# Patient Record
Sex: FEMALE | Race: WHITE | NOT HISPANIC OR LATINO | URBAN - METROPOLITAN AREA
[De-identification: names, ages, dates, MRNs, and addresses within clinical notes are randomized per-mention and may not be internally consistent; named-entity substitution may affect disease eponyms.]

---

## 2019-03-08 ENCOUNTER — APPOINTMENT (RX ONLY)
Dept: URBAN - METROPOLITAN AREA CLINIC 369 | Facility: CLINIC | Age: 34
Setting detail: DERMATOLOGY
End: 2019-03-08

## 2019-03-08 DIAGNOSIS — D22 MELANOCYTIC NEVI: ICD-10-CM

## 2019-03-08 DIAGNOSIS — L71.8 OTHER ROSACEA: ICD-10-CM

## 2019-03-08 DIAGNOSIS — L82.1 OTHER SEBORRHEIC KERATOSIS: ICD-10-CM

## 2019-03-08 DIAGNOSIS — L81.4 OTHER MELANIN HYPERPIGMENTATION: ICD-10-CM

## 2019-03-08 DIAGNOSIS — Z71.89 OTHER SPECIFIED COUNSELING: ICD-10-CM

## 2019-03-08 DIAGNOSIS — D18.0 HEMANGIOMA: ICD-10-CM

## 2019-03-08 PROBLEM — D22.61 MELANOCYTIC NEVI OF RIGHT UPPER LIMB, INCLUDING SHOULDER: Status: ACTIVE | Noted: 2019-03-08

## 2019-03-08 PROBLEM — D22.4 MELANOCYTIC NEVI OF SCALP AND NECK: Status: ACTIVE | Noted: 2019-03-08

## 2019-03-08 PROBLEM — D18.01 HEMANGIOMA OF SKIN AND SUBCUTANEOUS TISSUE: Status: ACTIVE | Noted: 2019-03-08

## 2019-03-08 PROBLEM — D22.5 MELANOCYTIC NEVI OF TRUNK: Status: ACTIVE | Noted: 2019-03-08

## 2019-03-08 PROCEDURE — ? BIOPSY BY SHAVE METHOD

## 2019-03-08 PROCEDURE — 99203 OFFICE O/P NEW LOW 30 MIN: CPT | Mod: 25

## 2019-03-08 PROCEDURE — 11103 TANGNTL BX SKIN EA SEP/ADDL: CPT

## 2019-03-08 PROCEDURE — 11102 TANGNTL BX SKIN SINGLE LES: CPT

## 2019-03-08 PROCEDURE — ? TREATMENT REGIMEN

## 2019-03-08 PROCEDURE — ? COUNSELING

## 2019-03-08 ASSESSMENT — LOCATION DETAILED DESCRIPTION DERM
LOCATION DETAILED: RIGHT INFERIOR CENTRAL MALAR CHEEK
LOCATION DETAILED: LEFT CLAVICULAR SKIN
LOCATION DETAILED: LEFT INFERIOR CENTRAL MALAR CHEEK
LOCATION DETAILED: RIGHT ANTERIOR PROXIMAL UPPER ARM
LOCATION DETAILED: RIGHT RIB CAGE
LOCATION DETAILED: EPIGASTRIC SKIN
LOCATION DETAILED: LEFT INFERIOR LATERAL NECK

## 2019-03-08 ASSESSMENT — LOCATION ZONE DERM
LOCATION ZONE: NECK
LOCATION ZONE: FACE
LOCATION ZONE: ARM
LOCATION ZONE: TRUNK

## 2019-03-08 ASSESSMENT — LOCATION SIMPLE DESCRIPTION DERM
LOCATION SIMPLE: LEFT ANTERIOR NECK
LOCATION SIMPLE: RIGHT CHEEK
LOCATION SIMPLE: LEFT CLAVICULAR SKIN
LOCATION SIMPLE: ABDOMEN
LOCATION SIMPLE: LEFT CHEEK
LOCATION SIMPLE: RIGHT UPPER ARM

## 2019-03-08 NOTE — PROCEDURE: BIOPSY BY SHAVE METHOD
Size Of Lesion In Cm: 0
Notification Instructions: Patient will be notified of biopsy results. However, patient instructed to call the office if not contacted within 2 weeks.
Cryotherapy Text: The wound bed was treated with cryotherapy after the biopsy was performed.
Silver Nitrate Text: The wound bed was treated with silver nitrate after the biopsy was performed.
Render Post-Care Instructions In Note?: yes
Hemostasis: Aluminum Chloride
Consent: Written consent was obtained and risks were reviewed including but not limited to scarring, infection, bleeding, scabbing, incomplete removal, nerve damage and allergy to anesthesia.
Detail Level: Detailed
Bill 94663 For Specimen Handling/Conveyance To Laboratory?: no
Anesthesia Volume In Cc (Will Not Render If 0): 1
Wound Care: Petrolatum
Dressing: bandage
Biopsy Method: Dermablade
Billing Type: Third-Party Bill
Curettage Text: The wound bed was treated with curettage after the biopsy was performed.
Depth Of Biopsy: dermis
Anesthesia Type: 1% lidocaine with epinephrine and a 1:10 solution of 8.4% sodium bicarbonate
Type Of Destruction Used: Curettage
Electrodesiccation Text: The wound bed was treated with electrodesiccation after the biopsy was performed.
Post-Care Instructions: I reviewed with the patient in detail post-care instructions. Patient is to keep the biopsy site dry overnight, and then apply bacitracin twice daily until healed. Patient may apply hydrogen peroxide soaks to remove any crusting.
Electrodesiccation And Curettage Text: The wound bed was treated with electrodesiccation and curettage after the biopsy was performed.
Biopsy Type: H and E
Body Location Override (Optional - Billing Will Still Be Based On Selected Body Map Location If Applicable): right upper arm
Body Location Override (Optional - Billing Will Still Be Based On Selected Body Map Location If Applicable): left neck
Body Location Override (Optional - Billing Will Still Be Based On Selected Body Map Location If Applicable): left clavicular skin superior
Body Location Override (Optional - Billing Will Still Be Based On Selected Body Map Location If Applicable): left clavicular skin inferior

## 2019-07-16 ENCOUNTER — APPOINTMENT (RX ONLY)
Dept: URBAN - METROPOLITAN AREA CLINIC 369 | Facility: CLINIC | Age: 34
Setting detail: DERMATOLOGY
End: 2019-07-16

## 2019-07-16 DIAGNOSIS — D22 MELANOCYTIC NEVI: ICD-10-CM

## 2019-07-16 DIAGNOSIS — L71.8 OTHER ROSACEA: ICD-10-CM

## 2019-07-16 DIAGNOSIS — D485 NEOPLASM OF UNCERTAIN BEHAVIOR OF SKIN: ICD-10-CM

## 2019-07-16 PROBLEM — D22.62 MELANOCYTIC NEVI OF LEFT UPPER LIMB, INCLUDING SHOULDER: Status: ACTIVE | Noted: 2019-07-16

## 2019-07-16 PROBLEM — D48.5 NEOPLASM OF UNCERTAIN BEHAVIOR OF SKIN: Status: ACTIVE | Noted: 2019-07-16

## 2019-07-16 PROBLEM — D22.5 MELANOCYTIC NEVI OF TRUNK: Status: ACTIVE | Noted: 2019-07-16

## 2019-07-16 PROCEDURE — 11102 TANGNTL BX SKIN SINGLE LES: CPT

## 2019-07-16 PROCEDURE — ? COUNSELING

## 2019-07-16 PROCEDURE — ? BIOPSY BY SHAVE METHOD

## 2019-07-16 PROCEDURE — 99213 OFFICE O/P EST LOW 20 MIN: CPT | Mod: 25

## 2019-07-16 PROCEDURE — 11103 TANGNTL BX SKIN EA SEP/ADDL: CPT

## 2019-07-16 PROCEDURE — ? PRESCRIPTION

## 2019-07-16 ASSESSMENT — LOCATION DETAILED DESCRIPTION DERM
LOCATION DETAILED: LEFT SUPERIOR LATERAL MALAR CHEEK
LOCATION DETAILED: LEFT DISTAL POSTERIOR UPPER ARM
LOCATION DETAILED: RIGHT MID-UPPER BACK
LOCATION DETAILED: LEFT MID-UPPER BACK

## 2019-07-16 ASSESSMENT — LOCATION SIMPLE DESCRIPTION DERM
LOCATION SIMPLE: LEFT CHEEK
LOCATION SIMPLE: RIGHT UPPER BACK
LOCATION SIMPLE: LEFT UPPER BACK
LOCATION SIMPLE: LEFT POSTERIOR UPPER ARM

## 2019-07-16 ASSESSMENT — LOCATION ZONE DERM
LOCATION ZONE: ARM
LOCATION ZONE: TRUNK
LOCATION ZONE: FACE

## 2019-07-16 NOTE — PROCEDURE: BIOPSY BY SHAVE METHOD
Wound Care: Petrolatum
Render Post-Care Instructions In Note?: no
X Size Of Lesion In Cm: 0
Dressing: bandage
Billing Type: Third-Party Bill
Consent: Written consent was obtained and risks were reviewed including but not limited to scarring, infection, bleeding, scabbing, incomplete removal, nerve damage and allergy to anesthesia.
Depth Of Biopsy: dermis
Biopsy Method: Dermablade
Detail Level: Detailed
Electrodesiccation Text: The wound bed was treated with electrodesiccation after the biopsy was performed.
Type Of Destruction Used: Curettage
Post-Care Instructions: I reviewed with the patient in detail post-care instructions. Patient is to keep the biopsy site dry overnight, and then apply bacitracin twice daily until healed. Patient may apply hydrogen peroxide soaks to remove any crusting.
Anesthesia Volume In Cc (Will Not Render If 0): 0.5
Hemostasis: Drysol
Was A Bandage Applied: Yes
Electrodesiccation And Curettage Text: The wound bed was treated with electrodesiccation and curettage after the biopsy was performed.
Curettage Text: The wound bed was treated with curettage after the biopsy was performed.
Silver Nitrate Text: The wound bed was treated with silver nitrate after the biopsy was performed.
Notification Instructions: Patient will be notified of biopsy results. However, patient instructed to call the office if not contacted within 2 weeks.
Cryotherapy Text: The wound bed was treated with cryotherapy after the biopsy was performed.
Biopsy Type: H and E
Anesthesia Type: 1% lidocaine with epinephrine

## 2019-07-18 RX ORDER — OXYMETAZOLINE HYDROCHLORIDE 10 MG/G
CREAM TOPICAL
Qty: 1 | Refills: 2 | Status: ERX | COMMUNITY
Start: 2019-07-18

## 2019-07-18 RX ADMIN — OXYMETAZOLINE HYDROCHLORIDE: 10 CREAM TOPICAL at 00:00

## 2019-07-26 ENCOUNTER — APPOINTMENT (RX ONLY)
Dept: URBAN - METROPOLITAN AREA CLINIC 369 | Facility: CLINIC | Age: 34
Setting detail: DERMATOLOGY
End: 2019-07-26

## 2019-07-26 DIAGNOSIS — D22 MELANOCYTIC NEVI: ICD-10-CM

## 2019-07-26 PROBLEM — D22.5 MELANOCYTIC NEVI OF TRUNK: Status: ACTIVE | Noted: 2019-07-26

## 2019-07-26 PROBLEM — D22.71 MELANOCYTIC NEVI OF RIGHT LOWER LIMB, INCLUDING HIP: Status: ACTIVE | Noted: 2019-07-26

## 2019-07-26 PROBLEM — L55.1 SUNBURN OF SECOND DEGREE: Status: ACTIVE | Noted: 2019-07-26

## 2019-07-26 PROCEDURE — 11300 SHAVE SKIN LESION 0.5 CM/<: CPT | Mod: 59

## 2019-07-26 PROCEDURE — ? COUNSELING

## 2019-07-26 PROCEDURE — ? SHAVE REMOVAL

## 2019-07-26 PROCEDURE — 11102 TANGNTL BX SKIN SINGLE LES: CPT

## 2019-07-26 PROCEDURE — ? BIOPSY BY SHAVE METHOD

## 2019-07-26 ASSESSMENT — LOCATION ZONE DERM
LOCATION ZONE: TRUNK
LOCATION ZONE: LEG

## 2019-07-26 ASSESSMENT — LOCATION DETAILED DESCRIPTION DERM
LOCATION DETAILED: RIGHT ANTERIOR PROXIMAL THIGH
LOCATION DETAILED: LEFT MID-UPPER BACK

## 2019-07-26 ASSESSMENT — LOCATION SIMPLE DESCRIPTION DERM
LOCATION SIMPLE: RIGHT THIGH
LOCATION SIMPLE: LEFT UPPER BACK

## 2019-07-26 NOTE — PROCEDURE: SHAVE REMOVAL
Post-Care Instructions: I reviewed with the patient in detail post-care instructions. Patient is to keep the biopsy site dry overnight, and then apply bacitracin twice daily until healed. Patient may apply hydrogen peroxide soaks to remove any crusting.
Billing Type: Third-Party Bill
Anesthesia Type: 1% lidocaine with epinephrine
Path Notes (To The Dermatopathologist): Please check margins.
Detail Level: Detailed
Render Post-Care Instructions In Note?: yes
Add Variable For Additional Medical Justification: No
Consent was obtained from the patient. The risks and benefits to therapy were discussed in detail. Specifically, the risks of infection, scarring, bleeding, prolonged wound healing, incomplete removal, allergy to anesthesia, nerve injury and recurrence were addressed. Prior to the procedure, the treatment site was clearly identified and confirmed by the patient. All components of Universal Protocol/PAUSE Rule completed.
Wound Care: Petrolatum
Medical Necessity Information: It is in your best interest to select a reason for this procedure from the list below. All of these items fulfill various CMS LCD requirements except the new and changing color options.
Biopsy Method: Dermablade
Medical Necessity Clause: This procedure was medically necessary because the lesion that was treated was: dysplastic compound nevus with moderate atypia close to margin
Anesthesia Volume In Cc: 0
Hemostasis: Aluminum Chloride
Notification Instructions: Patient will be notified of biopsy results. However, patient instructed to call the office if not contacted within 2 weeks.

## 2019-07-26 NOTE — PROCEDURE: BIOPSY BY SHAVE METHOD
Render In Bullet Format When Appropriate: No
Wound Care: Petrolatum
Was A Bandage Applied: Yes
Biopsy Method: Dermablade
Billing Type: Third-Party Bill
Anesthesia Type: 2% lidocaine without epinephrine
Depth Of Biopsy: dermis
Hemostasis: Aluminum Chloride
X Size Of Lesion In Cm: 0
Biopsy Type: H and E
Body Location Override (Optional - Billing Will Still Be Based On Selected Body Map Location If Applicable): right lateral thigh
Type Of Destruction Used: Curettage
Notification Instructions: Patient will be notified of biopsy results. However, patient instructed to call the office if not contacted within 2 weeks.
Post-Care Instructions: I reviewed with the patient in detail post-care instructions. Patient is to keep the biopsy site dry overnight, and then apply bacitracin/Cicalfate twice daily until healed. Patient may apply hydrogen peroxide soaks to remove any crusting.
Anesthesia Volume In Cc (Will Not Render If 0): 0.5
Consent: Written consent was obtained and risks were reviewed including but not limited to scarring, infection, bleeding, scabbing, incomplete removal, nerve damage and allergy to anesthesia.
Detail Level: Detailed
Silver Nitrate Text: The wound bed was treated with silver nitrate after the biopsy was performed.
Dressing: bandage

## 2020-01-10 ENCOUNTER — APPOINTMENT (RX ONLY)
Dept: URBAN - METROPOLITAN AREA CLINIC 369 | Facility: CLINIC | Age: 35
Setting detail: DERMATOLOGY
End: 2020-01-10

## 2020-01-10 DIAGNOSIS — Z71.89 OTHER SPECIFIED COUNSELING: ICD-10-CM

## 2020-01-10 DIAGNOSIS — Z87.2 PERSONAL HISTORY OF DISEASES OF THE SKIN AND SUBCUTANEOUS TISSUE: ICD-10-CM | Status: WELL CONTROLLED

## 2020-01-10 DIAGNOSIS — L81.4 OTHER MELANIN HYPERPIGMENTATION: ICD-10-CM

## 2020-01-10 DIAGNOSIS — D22 MELANOCYTIC NEVI: ICD-10-CM

## 2020-01-10 DIAGNOSIS — D18.0 HEMANGIOMA: ICD-10-CM

## 2020-01-10 DIAGNOSIS — L82.1 OTHER SEBORRHEIC KERATOSIS: ICD-10-CM

## 2020-01-10 PROBLEM — L29.8 OTHER PRURITUS: Status: ACTIVE | Noted: 2020-01-10

## 2020-01-10 PROBLEM — L23.7 ALLERGIC CONTACT DERMATITIS DUE TO PLANTS, EXCEPT FOOD: Status: ACTIVE | Noted: 2020-01-10

## 2020-01-10 PROBLEM — D18.01 HEMANGIOMA OF SKIN AND SUBCUTANEOUS TISSUE: Status: ACTIVE | Noted: 2020-01-10

## 2020-01-10 PROBLEM — D22.4 MELANOCYTIC NEVI OF SCALP AND NECK: Status: ACTIVE | Noted: 2020-01-10

## 2020-01-10 PROBLEM — D22.5 MELANOCYTIC NEVI OF TRUNK: Status: ACTIVE | Noted: 2020-01-10

## 2020-01-10 PROBLEM — L70.0 ACNE VULGARIS: Status: ACTIVE | Noted: 2020-01-10

## 2020-01-10 PROCEDURE — ? BIOPSY BY SHAVE METHOD

## 2020-01-10 PROCEDURE — ? COUNSELING

## 2020-01-10 PROCEDURE — 99214 OFFICE O/P EST MOD 30 MIN: CPT | Mod: 25

## 2020-01-10 PROCEDURE — 11102 TANGNTL BX SKIN SINGLE LES: CPT

## 2020-01-10 ASSESSMENT — LOCATION ZONE DERM
LOCATION ZONE: TRUNK
LOCATION ZONE: NECK

## 2020-01-10 ASSESSMENT — LOCATION DETAILED DESCRIPTION DERM
LOCATION DETAILED: EPIGASTRIC SKIN
LOCATION DETAILED: RIGHT MEDIAL TRAPEZIAL NECK
LOCATION DETAILED: LEFT MID-UPPER BACK
LOCATION DETAILED: RIGHT SUPERIOR LATERAL UPPER BACK

## 2020-01-10 ASSESSMENT — LOCATION SIMPLE DESCRIPTION DERM
LOCATION SIMPLE: LEFT UPPER BACK
LOCATION SIMPLE: POSTERIOR NECK
LOCATION SIMPLE: ABDOMEN
LOCATION SIMPLE: RIGHT BACK

## 2020-01-10 NOTE — PROCEDURE: BIOPSY BY SHAVE METHOD
Detail Level: Detailed
Silver Nitrate Text: The wound bed was treated with silver nitrate after the biopsy was performed.
Anesthesia Type: 2% lidocaine without epinephrine
Type Of Destruction Used: Curettage
Biopsy Type: H and E
Additional Anesthesia Volume In Cc (Will Not Render If 0): 0
Hide Accession Number?: No
Consent: Written consent was obtained and risks were reviewed including but not limited to scarring, infection, bleeding, scabbing, incomplete removal, nerve damage and allergy to anesthesia.
Depth Of Biopsy: dermis
Notification Instructions: Patient will be notified of biopsy results. However, patient instructed to call the office if not contacted within 2 weeks.
Billing Type: Third-Party Bill
Biopsy Method: Dermablade
Dressing: bandage
Anesthesia Volume In Cc (Will Not Render If 0): 0.5
Hemostasis: Aluminum Chloride
Render Post-Care Instructions In Note?: yes
Post-Care Instructions: I reviewed with the patient in detail post-care instructions. Patient is to keep the biopsy site dry overnight, and then apply bacitracin/Cicalfate twice daily until healed. Patient may apply hydrogen peroxide soaks to remove any crusting.
Wound Care: Petrolatum

## 2020-10-24 NOTE — HPI: FULL BODY SKIN EXAMINATION
What Type Of Note Output Would You Prefer (Optional)?: Standard Output
What Is The Reason For Today's Visit?: Full Body Skin Examination
What Is The Reason For Today's Visit? (Being Monitored For X): concerning skin lesions on an annual basis
How Severe Are Your Spot(S)?: mild
leonor roberts

## 2021-02-11 ENCOUNTER — APPOINTMENT (RX ONLY)
Dept: URBAN - METROPOLITAN AREA CLINIC 369 | Facility: CLINIC | Age: 36
Setting detail: DERMATOLOGY
End: 2021-02-11

## 2021-02-11 DIAGNOSIS — L30.9 DERMATITIS, UNSPECIFIED: ICD-10-CM | Status: INADEQUATELY CONTROLLED

## 2021-02-11 DIAGNOSIS — L71.8 OTHER ROSACEA: ICD-10-CM | Status: INADEQUATELY CONTROLLED

## 2021-02-11 PROCEDURE — ? MEDICAL CONSULTATION: PULSED-DYE LASER

## 2021-02-11 PROCEDURE — ? TREATMENT REGIMEN

## 2021-02-11 PROCEDURE — 99214 OFFICE O/P EST MOD 30 MIN: CPT

## 2021-02-11 PROCEDURE — ? COUNSELING

## 2021-02-11 PROCEDURE — ? PRESCRIPTION

## 2021-02-11 RX ORDER — OXYMETAZOLINE HYDROCHLORIDE 1 G/100G
CREAM TOPICAL
Qty: 1 | Refills: 3 | Status: ERX | COMMUNITY
Start: 2021-02-11

## 2021-02-11 RX ADMIN — OXYMETAZOLINE HYDROCHLORIDE: 1 CREAM TOPICAL at 00:00

## 2021-02-11 ASSESSMENT — LOCATION ZONE DERM
LOCATION ZONE: FACE
LOCATION ZONE: SCALP

## 2021-02-11 ASSESSMENT — LOCATION SIMPLE DESCRIPTION DERM
LOCATION SIMPLE: SCALP
LOCATION SIMPLE: RIGHT CHEEK
LOCATION SIMPLE: POSTERIOR SCALP
LOCATION SIMPLE: LEFT CHEEK

## 2021-02-11 ASSESSMENT — LOCATION DETAILED DESCRIPTION DERM
LOCATION DETAILED: LEFT CENTRAL MALAR CHEEK
LOCATION DETAILED: RIGHT INFERIOR MEDIAL MALAR CHEEK
LOCATION DETAILED: LEFT INFERIOR CENTRAL MALAR CHEEK
LOCATION DETAILED: LEFT CENTRAL FRONTAL SCALP
LOCATION DETAILED: POSTERIOR MID-PARIETAL SCALP
LOCATION DETAILED: RIGHT CENTRAL MALAR CHEEK

## 2021-02-11 NOTE — PROCEDURE: TREATMENT REGIMEN
Detail Level: Zone
Samples Given: Rhofade
Initiate Treatment: Apply Ultravate lotion (samples) to affected areas on the scalp twice daily for 2 weeks as needed for flares.
Initiate Treatment: Apply Rhofade cream to face in the morning
Samples Given: Ultavate lotion

## 2021-02-11 NOTE — PROCEDURE: MIPS QUALITY
Ibis Mcgrath
Quality 226: Preventive Care And Screening: Tobacco Use: Screening And Cessation Intervention: Tobacco Screening not Performed for Medical Reasons
Detail Level: Zone

## 2021-05-03 ENCOUNTER — RX ONLY (OUTPATIENT)
Age: 36
Setting detail: RX ONLY
End: 2021-05-03

## 2021-05-03 RX ORDER — AZELAIC ACID 0.15 G/G
GEL TOPICAL
Qty: 1 | Refills: 3 | Status: ERX | COMMUNITY
Start: 2021-05-03

## 2022-08-29 ENCOUNTER — APPOINTMENT (RX ONLY)
Dept: URBAN - METROPOLITAN AREA CLINIC 346 | Facility: CLINIC | Age: 37
Setting detail: DERMATOLOGY
End: 2022-08-29

## 2022-08-29 DIAGNOSIS — Z87.2 PERSONAL HISTORY OF DISEASES OF THE SKIN AND SUBCUTANEOUS TISSUE: ICD-10-CM

## 2022-08-29 DIAGNOSIS — D22 MELANOCYTIC NEVI: ICD-10-CM

## 2022-08-29 DIAGNOSIS — D18.0 HEMANGIOMA: ICD-10-CM

## 2022-08-29 DIAGNOSIS — L81.4 OTHER MELANIN HYPERPIGMENTATION: ICD-10-CM

## 2022-08-29 DIAGNOSIS — L82.1 OTHER SEBORRHEIC KERATOSIS: ICD-10-CM

## 2022-08-29 DIAGNOSIS — Z71.89 OTHER SPECIFIED COUNSELING: ICD-10-CM

## 2022-08-29 PROBLEM — D18.01 HEMANGIOMA OF SKIN AND SUBCUTANEOUS TISSUE: Status: ACTIVE | Noted: 2022-08-29

## 2022-08-29 PROBLEM — D22.5 MELANOCYTIC NEVI OF TRUNK: Status: ACTIVE | Noted: 2022-08-29

## 2022-08-29 PROCEDURE — 99213 OFFICE O/P EST LOW 20 MIN: CPT

## 2022-08-29 PROCEDURE — ? COUNSELING

## 2022-08-29 PROCEDURE — ? FULL BODY SKIN EXAM

## 2022-08-29 PROCEDURE — ? TREATMENT REGIMEN

## 2022-08-29 ASSESSMENT — LOCATION DETAILED DESCRIPTION DERM
LOCATION DETAILED: SUPERIOR LUMBAR SPINE
LOCATION DETAILED: SUPERIOR THORACIC SPINE
LOCATION DETAILED: INFERIOR THORACIC SPINE
LOCATION DETAILED: LEFT SUPERIOR UPPER BACK
LOCATION DETAILED: RIGHT SUPERIOR UPPER BACK
LOCATION DETAILED: LEFT VENTRAL DISTAL FOREARM
LOCATION DETAILED: LEFT SUPERIOR LATERAL MALAR CHEEK

## 2022-08-29 ASSESSMENT — LOCATION ZONE DERM
LOCATION ZONE: FACE
LOCATION ZONE: ARM
LOCATION ZONE: TRUNK

## 2022-08-29 ASSESSMENT — LOCATION SIMPLE DESCRIPTION DERM
LOCATION SIMPLE: LEFT FOREARM
LOCATION SIMPLE: LEFT UPPER BACK
LOCATION SIMPLE: LEFT CHEEK
LOCATION SIMPLE: UPPER BACK
LOCATION SIMPLE: RIGHT UPPER BACK
LOCATION SIMPLE: LOWER BACK